# Patient Record
Sex: FEMALE | Race: WHITE | URBAN - METROPOLITAN AREA
[De-identification: names, ages, dates, MRNs, and addresses within clinical notes are randomized per-mention and may not be internally consistent; named-entity substitution may affect disease eponyms.]

---

## 2017-09-12 ENCOUNTER — OFFICE VISIT (OUTPATIENT)
Dept: FAMILY MEDICINE CLINIC | Facility: CLINIC | Age: 72
End: 2017-09-12

## 2017-09-12 VITALS
OXYGEN SATURATION: 95 % | WEIGHT: 100 LBS | HEART RATE: 55 BPM | DIASTOLIC BLOOD PRESSURE: 128 MMHG | BODY MASS INDEX: 20.16 KG/M2 | RESPIRATION RATE: 16 BRPM | HEIGHT: 59 IN | SYSTOLIC BLOOD PRESSURE: 218 MMHG | TEMPERATURE: 98 F

## 2017-09-12 DIAGNOSIS — H91.93 HEARING DIFFICULTY OF BOTH EARS: Primary | ICD-10-CM

## 2017-09-12 DIAGNOSIS — Z53.29 LEFT AGAINST MEDICAL ADVICE: ICD-10-CM

## 2017-09-12 DIAGNOSIS — R03.0 ELEVATED BLOOD PRESSURE READING: ICD-10-CM

## 2017-09-12 PROCEDURE — 99202 OFFICE O/P NEW SF 15 MIN: CPT | Performed by: NURSE PRACTITIONER

## 2017-09-12 NOTE — PROGRESS NOTES
CHIEF COMPLAINT:   Patient presents with:  Cerumen Impaction: difficulty hearing      HPI:   Bharat Corrigan is a 66year old female who presents to clinic today with complaints of decreased hearing bilaterally. Has had for 1  days. PT denies pain.   [place]yanne manipulation. External auditory canals healthy. Right TM: perforated - gray, non bulging, noretraction, no effusion, bony landmarks not visible. Left TM: gray, scarring +, non bulging, no retraction,no effusion, bony landmarks dulled. No cerumen.    NOSE: they arrived. PT reported she would go to the hospital. Asked patient if I could discuss her . Pt declined saying she would discuss with him. Pt signed AMA form. Discussed with patient that she is leaving against medical advise.       Urged patie